# Patient Record
Sex: FEMALE | Race: WHITE | NOT HISPANIC OR LATINO
[De-identification: names, ages, dates, MRNs, and addresses within clinical notes are randomized per-mention and may not be internally consistent; named-entity substitution may affect disease eponyms.]

---

## 2019-01-07 PROBLEM — Z00.00 ENCOUNTER FOR PREVENTIVE HEALTH EXAMINATION: Status: ACTIVE | Noted: 2019-01-07

## 2019-01-21 ENCOUNTER — ASOB RESULT (OUTPATIENT)
Age: 29
End: 2019-01-21

## 2019-01-21 ENCOUNTER — APPOINTMENT (OUTPATIENT)
Dept: ANTEPARTUM | Facility: CLINIC | Age: 29
End: 2019-01-21
Payer: COMMERCIAL

## 2019-01-21 PROCEDURE — 76817 TRANSVAGINAL US OBSTETRIC: CPT

## 2019-01-21 PROCEDURE — 76805 OB US >/= 14 WKS SNGL FETUS: CPT

## 2019-03-16 ENCOUNTER — ASOB RESULT (OUTPATIENT)
Age: 29
End: 2019-03-16

## 2019-03-16 ENCOUNTER — APPOINTMENT (OUTPATIENT)
Dept: ANTEPARTUM | Facility: CLINIC | Age: 29
End: 2019-03-16
Payer: COMMERCIAL

## 2019-03-16 PROCEDURE — 76819 FETAL BIOPHYS PROFIL W/O NST: CPT

## 2019-03-16 PROCEDURE — 76816 OB US FOLLOW-UP PER FETUS: CPT

## 2019-04-26 ENCOUNTER — ASOB RESULT (OUTPATIENT)
Age: 29
End: 2019-04-26

## 2019-04-26 ENCOUNTER — APPOINTMENT (OUTPATIENT)
Dept: ANTEPARTUM | Facility: CLINIC | Age: 29
End: 2019-04-26
Payer: COMMERCIAL

## 2019-04-26 PROCEDURE — 76816 OB US FOLLOW-UP PER FETUS: CPT

## 2019-07-17 ENCOUNTER — RESULT REVIEW (OUTPATIENT)
Age: 29
End: 2019-07-17

## 2020-08-21 ENCOUNTER — RESULT REVIEW (OUTPATIENT)
Age: 30
End: 2020-08-21

## 2021-02-03 ENCOUNTER — ASOB RESULT (OUTPATIENT)
Age: 31
End: 2021-02-03

## 2021-02-03 ENCOUNTER — APPOINTMENT (OUTPATIENT)
Dept: ANTEPARTUM | Facility: CLINIC | Age: 31
End: 2021-02-03
Payer: COMMERCIAL

## 2021-02-03 PROCEDURE — 99072 ADDL SUPL MATRL&STAF TM PHE: CPT

## 2021-02-03 PROCEDURE — 76805 OB US >/= 14 WKS SNGL FETUS: CPT

## 2021-04-22 ENCOUNTER — ASOB RESULT (OUTPATIENT)
Age: 31
End: 2021-04-22

## 2021-04-22 ENCOUNTER — APPOINTMENT (OUTPATIENT)
Dept: ANTEPARTUM | Facility: CLINIC | Age: 31
End: 2021-04-22
Payer: COMMERCIAL

## 2021-04-22 PROCEDURE — 99072 ADDL SUPL MATRL&STAF TM PHE: CPT

## 2021-04-22 PROCEDURE — 76816 OB US FOLLOW-UP PER FETUS: CPT

## 2021-05-14 ENCOUNTER — ASOB RESULT (OUTPATIENT)
Age: 31
End: 2021-05-14

## 2021-05-14 ENCOUNTER — APPOINTMENT (OUTPATIENT)
Dept: ANTEPARTUM | Facility: CLINIC | Age: 31
End: 2021-05-14
Payer: COMMERCIAL

## 2021-05-14 ENCOUNTER — NON-APPOINTMENT (OUTPATIENT)
Age: 31
End: 2021-05-14

## 2021-05-14 PROCEDURE — 99072 ADDL SUPL MATRL&STAF TM PHE: CPT

## 2021-05-14 PROCEDURE — 76816 OB US FOLLOW-UP PER FETUS: CPT

## 2021-06-17 ENCOUNTER — ASOB RESULT (OUTPATIENT)
Age: 31
End: 2021-06-17

## 2021-06-17 ENCOUNTER — APPOINTMENT (OUTPATIENT)
Dept: ANTEPARTUM | Facility: CLINIC | Age: 31
End: 2021-06-17
Payer: COMMERCIAL

## 2021-06-17 VITALS
WEIGHT: 215 LBS | SYSTOLIC BLOOD PRESSURE: 90 MMHG | BODY MASS INDEX: 36.7 KG/M2 | HEIGHT: 64 IN | RESPIRATION RATE: 16 BRPM | DIASTOLIC BLOOD PRESSURE: 62 MMHG | OXYGEN SATURATION: 98 % | HEART RATE: 88 BPM

## 2021-06-17 DIAGNOSIS — O34.219 MATERNAL CARE FOR UNSPECIFIED TYPE SCAR FROM PREVIOUS CESAREAN DELIVERY: ICD-10-CM

## 2021-06-17 DIAGNOSIS — O36.60X0 MATERNAL CARE FOR EXCESSIVE FETAL GROWTH, UNSPECIFIED TRIMESTER, NOT APPLICABLE OR UNSPECIFIED: ICD-10-CM

## 2021-06-17 DIAGNOSIS — Z3A.40 40 WEEKS GESTATION OF PREGNANCY: ICD-10-CM

## 2021-06-17 DIAGNOSIS — Z87.19 PERSONAL HISTORY OF OTHER DISEASES OF THE DIGESTIVE SYSTEM: ICD-10-CM

## 2021-06-17 PROCEDURE — 99072 ADDL SUPL MATRL&STAF TM PHE: CPT

## 2021-06-17 PROCEDURE — 99443: CPT

## 2021-06-17 PROCEDURE — 76816 OB US FOLLOW-UP PER FETUS: CPT

## 2021-06-17 RX ORDER — VITAMIN C, CALCIUM, IRON, VITAMIN D3, VITAMIN E, VITAMIN B1, VITAMIN B2, VITAMIN B3, VITAMIN B6, FOLIC ACID, IODINE, ZINC, COPPER, DOCUSATE SODIUM, DOCOSAHEXAENOIC ACID (DHA) 27-1-50 MG
KIT ORAL
Refills: 0 | Status: ACTIVE | COMMUNITY

## 2021-06-17 NOTE — DATA REVIEWED
[FreeTextEntry1] : Edward history is significant for a prior delivery in 2019, when she had a rapid and relatively easy labor presenting to L and D at Fully dilated. She was a known breech presentation and had an urgent  due to the malpresentation and expected large fetal size. The  was 9lbs 9 ounces. \par \par This pregnancy has been unevenful, with a large fetus being diagnosed again. She is aware previously, and was counseled again as to the limitations of a trial of labor after cesarian. She is aware that induction of labor is contraindicated, and a trial of labor may be considered if there are no contraindications. \par \par Both her current gestational age of 40 weeks, and the estimated fetal weight of 4348g are relative contraindications, but not absolute. SHe was counseled extensively about the risks of continuing the pregnancy due to post dates, as well as the success rate of a vaginal delivery with a macrosomic fetus, including concerns of shoulder dystocia.\par \par While she progressed to fully dilated with a 9.5 lbd child last pregnancy, she did not go through the second stage at all.\par \par She is aware of the risks and concerns, and agrees to a scheduled repeat  in the absence of labor. The timing will be acceptable early/mid next week, as she was reassured with the Category 1 NST, and normal ALMA DELIA on the BPP today.  \par \par

## 2021-06-17 NOTE — HISTORY OF PRESENT ILLNESS
[FreeTextEntry1] : Maryana gives verbal consent to have a telephonic consultation regarding delivery planning.

## 2021-06-17 NOTE — DISCUSSION/SUMMARY
[FreeTextEntry1] : Awaiting spontaneous labor.\par \par If no labor, delivery by repeat  by .\par \par If sppontaneous labor, a trial for vaginal delivery is acceptable, however she was counseled on the high failure rate if poor labor progress is noted.,

## 2021-07-26 ENCOUNTER — RESULT REVIEW (OUTPATIENT)
Age: 31
End: 2021-07-26

## 2023-03-28 ENCOUNTER — APPOINTMENT (OUTPATIENT)
Dept: OBGYN | Facility: CLINIC | Age: 33
End: 2023-03-28
Payer: COMMERCIAL

## 2023-03-28 VITALS
WEIGHT: 175 LBS | HEIGHT: 65 IN | BODY MASS INDEX: 29.16 KG/M2 | SYSTOLIC BLOOD PRESSURE: 132 MMHG | DIASTOLIC BLOOD PRESSURE: 85 MMHG

## 2023-03-28 DIAGNOSIS — Z87.898 PERSONAL HISTORY OF OTHER SPECIFIED CONDITIONS: ICD-10-CM

## 2023-03-28 DIAGNOSIS — Z80.41 FAMILY HISTORY OF MALIGNANT NEOPLASM OF OVARY: ICD-10-CM

## 2023-03-28 DIAGNOSIS — Z01.419 ENCOUNTER FOR GYNECOLOGICAL EXAMINATION (GENERAL) (ROUTINE) W/OUT ABNORMAL FINDINGS: ICD-10-CM

## 2023-03-28 DIAGNOSIS — Z87.891 PERSONAL HISTORY OF NICOTINE DEPENDENCE: ICD-10-CM

## 2023-03-28 PROCEDURE — 99385 PREV VISIT NEW AGE 18-39: CPT

## 2023-03-28 NOTE — HISTORY OF PRESENT ILLNESS
[FreeTextEntry1] : pt is a 33 y/o p2 lmp 3/10 presents for new pt annual gyn visit  [No] : Patient does not have concerns regarding sex

## 2023-03-29 LAB — HPV HIGH+LOW RISK DNA PNL CVX: NOT DETECTED

## 2023-04-05 LAB — CYTOLOGY CVX/VAG DOC THIN PREP: NORMAL
